# Patient Record
Sex: MALE | Race: WHITE | Employment: FULL TIME | URBAN - METROPOLITAN AREA
[De-identification: names, ages, dates, MRNs, and addresses within clinical notes are randomized per-mention and may not be internally consistent; named-entity substitution may affect disease eponyms.]

---

## 2017-02-06 PROCEDURE — 86663 EPSTEIN-BARR ANTIBODY: CPT | Performed by: INTERNAL MEDICINE

## 2017-02-06 PROCEDURE — 86665 EPSTEIN-BARR CAPSID VCA: CPT | Performed by: INTERNAL MEDICINE

## 2017-02-06 PROCEDURE — 86664 EPSTEIN-BARR NUCLEAR ANTIGEN: CPT | Performed by: INTERNAL MEDICINE

## 2024-08-10 ENCOUNTER — HOSPITAL ENCOUNTER (EMERGENCY)
Facility: HOSPITAL | Age: 34
Discharge: HOME | End: 2024-08-10
Attending: EMERGENCY MEDICINE
Payer: COMMERCIAL

## 2024-08-10 ENCOUNTER — HOSPITAL ENCOUNTER (OUTPATIENT)
Age: 34
Discharge: ANOTHER HOSPITAL | End: 2024-08-10
Attending: EMERGENCY MEDICINE
Payer: COMMERCIAL

## 2024-08-10 VITALS
BODY MASS INDEX: 23.7 KG/M2 | RESPIRATION RATE: 23 BRPM | TEMPERATURE: 97 F | HEART RATE: 71 BPM | HEIGHT: 69 IN | DIASTOLIC BLOOD PRESSURE: 79 MMHG | SYSTOLIC BLOOD PRESSURE: 117 MMHG | OXYGEN SATURATION: 100 % | WEIGHT: 160 LBS

## 2024-08-10 VITALS
OXYGEN SATURATION: 98 % | TEMPERATURE: 99 F | SYSTOLIC BLOOD PRESSURE: 121 MMHG | HEART RATE: 94 BPM | DIASTOLIC BLOOD PRESSURE: 73 MMHG | RESPIRATION RATE: 18 BRPM

## 2024-08-10 DIAGNOSIS — T78.2XXA ANAPHYLAXIS, INITIAL ENCOUNTER: Primary | ICD-10-CM

## 2024-08-10 DIAGNOSIS — T63.441A BEE STING, ACCIDENTAL OR UNINTENTIONAL, INITIAL ENCOUNTER: ICD-10-CM

## 2024-08-10 PROCEDURE — 99284 EMERGENCY DEPT VISIT MOD MDM: CPT

## 2024-08-10 PROCEDURE — 99203 OFFICE O/P NEW LOW 30 MIN: CPT

## 2024-08-10 RX ORDER — EPINEPHRINE 0.3 MG/.3ML
0.3 INJECTION SUBCUTANEOUS
Qty: 1 EACH | Refills: 0 | Status: SHIPPED | OUTPATIENT
Start: 2024-08-10 | End: 2024-09-09

## 2024-08-10 RX ORDER — FAMOTIDINE 20 MG/1
20 TABLET, FILM COATED ORAL ONCE
Status: COMPLETED | OUTPATIENT
Start: 2024-08-10 | End: 2024-08-10

## 2024-08-10 RX ORDER — DIPHENHYDRAMINE HCL 25 MG
50 CAPSULE ORAL ONCE
Status: COMPLETED | OUTPATIENT
Start: 2024-08-10 | End: 2024-08-10

## 2024-08-10 NOTE — ED INITIAL ASSESSMENT (HPI)
Presents to ED with bee sting to right side of head  Pt used his epi pen around 1115 - hx anaphylaxis from bee sting  Sent from IC for further monitoring following epi admin    Pt denies CHASE, difficulty swallowing, CP at this time  Reports itching

## 2024-08-10 NOTE — ED INITIAL ASSESSMENT (HPI)
Patient was \"swarmed by bees\" and stung on right scalp and right ear PTA. Hx of anaphylaxis. Self injected Epi pen immediately after feeling some throat tightness about 15-20 min ago. No tongue or lip swelling. No rash. No respiratory distress.

## 2024-08-10 NOTE — ED PROVIDER NOTES
Patient Seen in: [facility]      History     Chief Complaint   Patient presents with    Bite Sting,Insect     Stated Complaint: Bee stung    Subjective:     34-year-old male presents today for evaluation of bee sting.  Patient reports just prior to arrival he was working outside and was stung by a bee just above his right ear on his scalp.  He states he immediately became  felt throat tightness.  Used EpiPen and came immediately to the immediate care.  Patient reports throat tightness has improved.  No shortness of breath.  Now getting a rash and feels itchy.  No wheezing.    Objective:   Past Medical History:    ADD (attention deficit disorder)    Hx of diseases NEC    Lactose intolerance              Past Surgical History:   Procedure Laterality Date    Other surgical history      wisdom teeth                Social History     Socioeconomic History    Marital status:    Tobacco Use    Smoking status: Former     Current packs/day: 0.50     Average packs/day: 0.5 packs/day for 1 year (0.5 ttl pk-yrs)     Types: Cigarettes    Smokeless tobacco: Never   Vaping Use    Vaping status: Never Used   Substance and Sexual Activity    Alcohol use: Yes     Alcohol/week: 0.0 standard drinks of alcohol     Comment: occ                Physical Exam     ED Triage Vitals [08/10/24 1133]   /73   Pulse 94   Resp 18   Temp 98.6 °F (37 °C)   Temp src Temporal   SpO2 98 %   O2 Device None (Room air)       Current:/73   Pulse 94   Temp 98.6 °F (37 °C) (Temporal)   Resp 18   SpO2 98%         Physical Exam  Vitals reviewed.   Constitutional:       General: He is not in acute distress.     Appearance: Normal appearance. He is not ill-appearing or toxic-appearing.   HENT:      Head: Normocephalic and atraumatic.      Mouth/Throat:      Mouth: Mucous membranes are moist.      Pharynx: Oropharynx is clear. No pharyngeal swelling, oropharyngeal exudate or posterior oropharyngeal erythema.   Eyes:       Extraocular Movements: Extraocular movements intact.      Conjunctiva/sclera: Conjunctivae normal.   Cardiovascular:      Rate and Rhythm: Normal rate and regular rhythm.   Pulmonary:      Effort: Pulmonary effort is normal.      Breath sounds: Normal breath sounds.   Musculoskeletal:      Cervical back: Neck supple.   Skin:     General: Skin is warm and dry.      Findings: Erythema (Mild diffuse) present.   Neurological:      Mental Status: He is alert and oriented to person, place, and time.   Psychiatric:         Mood and Affect: Mood normal.         ED Course   Labs Reviewed - No data to display  Imaging:  No results found.              MDM        34 year old male with anaphylaxis after bee sting.  Will send to the emergency department for further monitoring.    Differential diagnosis (including but not limited to):  Anaphylaxis    ED course:  Pulse Ox: 98% on room air which is normal      Comment: Please note this report has been produced using speech recognition software and may contain errors related to that system including errors in grammar, punctuation, and spelling, as well as words and phrases that may be inappropriate. If there are any questions or concerns please feel free to contact the dictating provider for clarification.                                     Medical Decision Making      Disposition and Plan     Clinical Impression:  1. Anaphylaxis, initial encounter    2. Bee sting, accidental or unintentional, initial encounter         Disposition:  Ic to ed  8/10/2024 11:48 am    Follow-up:  No follow-up provider specified.        Medications Prescribed:  Discharge Medication List as of 8/10/2024 11:48 AM                                 Redd Qiu MD  8/10/2024  11:48 AM

## 2024-08-11 NOTE — ED PROVIDER NOTES
Patient Seen in: [facility] Emergency Department    History     Chief Complaint   Patient presents with    Allergic Rxn Allergies       HPI    The patient presents to the ED complaining of sustaining a bee sting to the right posterior part of his head around 1115 tonight.  He has a severe allergy to bee stings and developed anaphylactic symptoms immediately following including difficulty breathing and hives.  He used his EpiPen immediately and began to feel better.  He went to immediate care and was advised on going to the ER for care.  Now presents without any symptoms stating that he feels fine at this time.    History reviewed.   Past Medical History:    ADD (attention deficit disorder)    Hx of diseases NEC    Lactose intolerance       History reviewed.   Past Surgical History:   Procedure Laterality Date    Other surgical history      wisdom teeth         Medications :  (Not in a hospital admission)       Family History   Problem Relation Age of Onset    Lipids Father     Diabetes Mother     Hypertension Mother     Cancer Maternal Grandmother         breast    Cancer Paternal Grandmother         breast       Smoking Status:   Social History     Socioeconomic History    Marital status:    Tobacco Use    Smoking status: Former     Current packs/day: 0.50     Average packs/day: 0.5 packs/day for 1 year (0.5 ttl pk-yrs)     Types: Cigarettes    Smokeless tobacco: Never   Vaping Use    Vaping status: Never Used   Substance and Sexual Activity    Alcohol use: Yes     Alcohol/week: 0.0 standard drinks of alcohol     Comment: occ       Constitutional and vital signs reviewed.      Social History and Family History elements reviewed from today, pertinent positives to the presenting problem noted.    Physical Exam     ED Triage Vitals [08/10/24 1210]   /76   Pulse 83   Resp 16   Temp 97.2 °F (36.2 °C)   Temp src    SpO2 99 %   O2 Device None (Room air)       All measures to prevent infection  transmission during my interaction with the patient were taken. Handwashing was performed prior to and after the exam.  Stethoscope and any equipment used during my examination was cleaned with super sani-cloth germicidal wipes following the exam.     Physical Exam  Vitals and nursing note reviewed.   Constitutional:       General: He is not in acute distress.     Appearance: He is well-developed. He is not ill-appearing or toxic-appearing.   HENT:      Head: Normocephalic and atraumatic.   Eyes:      General:         Right eye: No discharge.         Left eye: No discharge.      Conjunctiva/sclera: Conjunctivae normal.   Neck:      Trachea: No tracheal deviation.   Cardiovascular:      Rate and Rhythm: Normal rate and regular rhythm.   Pulmonary:      Effort: Pulmonary effort is normal. No respiratory distress.      Breath sounds: Normal breath sounds. No stridor.   Abdominal:      General: There is no distension.      Palpations: Abdomen is soft.   Musculoskeletal:         General: No deformity.   Skin:     General: Skin is warm and dry.      Findings: No erythema or rash.   Neurological:      Mental Status: He is alert and oriented to person, place, and time.   Psychiatric:         Mood and Affect: Mood normal.         Behavior: Behavior normal.         ED Course      Labs Reviewed - No data to display    As Interpreted by me    Imaging Results Available and Reviewed while in ED: No results found.  ED Medications Administered:   Medications   diphenhydrAMINE (Benadryl) cap/tab 50 mg (50 mg Oral Given 8/10/24 1314)   famotidine (Pepcid) tab 20 mg (20 mg Oral Given 8/10/24 1314)         MDM     Vitals:    08/10/24 1210 08/10/24 1215 08/10/24 1300   BP: 119/76 112/83 117/79   Pulse: 83 70 71   Resp: 16 16 23   Temp: 97.2 °F (36.2 °C)     SpO2: 99% 100% 100%   Weight: 72.6 kg     Height: 175.3 cm (5' 9\")       *I personally reviewed and interpreted all ED vitals.    Pulse Ox: 100%, Room air, Normal     Monitor  Interpretation:   normal sinus rhythm as interpreted by me.  The cardiac monitor was ordered to monitor heart rate.    Differential Diagnosis/ Diagnostic Considerations: Anaphylaxis, allergic reaction, other    Complicating Factors: The patient already has does not have any pertinent problems on file. to contribute to the complexity of this ED evaluation.    Medical Decision Making  The patient presents to the ED about an hour and a half after developing anaphylactic symptoms after a bee sting.  Now better with EpiPen treatment at home.  Patient without any symptoms during his ER observation.  Given Benadryl and Pepcid.  Comfortable going home at this time and will discharge with a refill of EpiPen.    Problems Addressed:  Anaphylaxis, initial encounter: acute illness or injury that poses a threat to life or bodily functions    Risk  Prescription drug management.        Condition upon leaving the department: Stable    Disposition and Plan     Clinical Impression:  1. Anaphylaxis, initial encounter        Disposition:  Discharge    Follow-up:  Your doctor    Schedule an appointment as soon as possible for a visit in 3 day(s)        Medications Prescribed:  Discharge Medication List as of 8/10/2024  1:15 PM        START taking these medications    Details   !! EPINEPHrine 0.3 MG/0.3ML Injection Solution Auto-injector Inject 0.3 mL (1 each total) into the muscle daily as needed., Normal, Disp-1 each, R-0       !! - Potential duplicate medications found. Please discuss with provider.